# Patient Record
Sex: MALE | Race: WHITE | URBAN - METROPOLITAN AREA
[De-identification: names, ages, dates, MRNs, and addresses within clinical notes are randomized per-mention and may not be internally consistent; named-entity substitution may affect disease eponyms.]

---

## 2020-03-24 ENCOUNTER — OFFICE VISIT (OUTPATIENT)
Dept: URBAN - METROPOLITAN AREA CLINIC 71 | Facility: CLINIC | Age: 50
End: 2020-03-24
Payer: COMMERCIAL

## 2020-03-24 PROCEDURE — 92014 COMPRE OPH EXAM EST PT 1/>: CPT | Performed by: OPHTHALMOLOGY

## 2020-03-24 RX ORDER — AZITHROMYCIN DIHYDRATE 250 MG/1
250 MG TABLET, FILM COATED ORAL
Qty: 6 | Refills: 0 | Status: INACTIVE
Start: 2020-03-24 | End: 2020-03-24

## 2020-03-24 RX ORDER — AZITHROMYCIN DIHYDRATE 250 MG/1
250 MG TABLET, FILM COATED ORAL
Qty: 6 | Refills: 0 | Status: ACTIVE
Start: 2020-03-24

## 2020-03-24 ASSESSMENT — INTRAOCULAR PRESSURE
OS: 19
OD: 22

## 2020-03-24 NOTE — IMPRESSION/PLAN
Impression: Unspecified exophthalmos: H05.20. OD: 23mm at base of 104; swelling OS: 19mm at base of 104 Right. Plan: discussed that as of now there is something else that is putting pressure on the OD eye its self. Order CBC w/ diff and sed rate. Order MRI. - proptosis, with swelling & diplopia, rule out infections vs inflammation disorder vs hemorrhage.

## 2020-04-07 ENCOUNTER — OFFICE VISIT (OUTPATIENT)
Dept: URBAN - METROPOLITAN AREA CLINIC 71 | Facility: CLINIC | Age: 50
End: 2020-04-07
Payer: COMMERCIAL

## 2020-04-07 DIAGNOSIS — H05.20 UNSPECIFIED EXOPHTHALMOS: Primary | ICD-10-CM

## 2020-04-07 PROCEDURE — 99212 OFFICE O/P EST SF 10 MIN: CPT | Performed by: OPHTHALMOLOGY

## 2020-04-07 ASSESSMENT — INTRAOCULAR PRESSURE
OS: 14
OD: 13

## 2020-04-07 NOTE — IMPRESSION/PLAN
Impression: Unspecified exophthalmos: H05.20 Right. Resolved Plan: OU: Discussed diagnosis in detail with patient. exophthalmos resolved. No significant diplopia. Pt has past history of orbital floor fracture. No unusual findings on MRI or blood work. Orbits intact. No retrobulbar mass or pathology. Changes in frontal lobe may be due to past trauma. Pt advised if things are stable to continue to follow in 6 weeks. Patient advised if condition worsens, contact office to be seen.

## 2020-05-19 ENCOUNTER — OFFICE VISIT (OUTPATIENT)
Dept: URBAN - METROPOLITAN AREA CLINIC 71 | Facility: CLINIC | Age: 50
End: 2020-05-19
Payer: COMMERCIAL

## 2020-05-19 DIAGNOSIS — H25.13 AGE-RELATED NUCLEAR CATARACT, BILATERAL: ICD-10-CM

## 2020-05-19 PROCEDURE — 92012 INTRM OPH EXAM EST PATIENT: CPT | Performed by: OPHTHALMOLOGY

## 2020-05-19 ASSESSMENT — KERATOMETRY
OD: 44.25
OS: 44.25

## 2020-05-19 ASSESSMENT — INTRAOCULAR PRESSURE
OS: 16
OD: 15

## 2020-05-19 NOTE — IMPRESSION/PLAN
Impression: Unspecified exophthalmos: H05.20 Right. improved Plan: OD: Discussed diagnosis in detail with patient. Pt states he never started taking the oral steroids. Pt states occasional double vision but goes away quickly OD: 20mm at base of 107 OS: 21mm at base of 107

## 2025-07-10 ENCOUNTER — OFFICE VISIT (OUTPATIENT)
Dept: URBAN - METROPOLITAN AREA CLINIC 72 | Facility: CLINIC | Age: 55
End: 2025-07-10
Payer: COMMERCIAL

## 2025-07-10 DIAGNOSIS — T15.02XA FOREIGN BODY IN CORNEA OF LEFT EYE, INITIAL ENCOUNTER: Primary | ICD-10-CM

## 2025-07-10 PROCEDURE — 99203 OFFICE O/P NEW LOW 30 MIN: CPT | Performed by: OPTOMETRIST

## 2025-07-18 ENCOUNTER — OFFICE VISIT (OUTPATIENT)
Dept: URBAN - METROPOLITAN AREA CLINIC 72 | Facility: CLINIC | Age: 55
End: 2025-07-18
Payer: COMMERCIAL

## 2025-07-18 DIAGNOSIS — T15.02XA FOREIGN BODY IN CORNEA OF LEFT EYE, INITIAL ENCOUNTER: Primary | ICD-10-CM

## 2025-07-18 PROCEDURE — 99212 OFFICE O/P EST SF 10 MIN: CPT | Performed by: OPTOMETRIST

## 2025-07-18 ASSESSMENT — INTRAOCULAR PRESSURE
OS: 16
OD: 15